# Patient Record
Sex: FEMALE | Race: WHITE | NOT HISPANIC OR LATINO | Employment: UNEMPLOYED | URBAN - METROPOLITAN AREA
[De-identification: names, ages, dates, MRNs, and addresses within clinical notes are randomized per-mention and may not be internally consistent; named-entity substitution may affect disease eponyms.]

---

## 2019-08-25 ENCOUNTER — HOSPITAL ENCOUNTER (EMERGENCY)
Facility: HOSPITAL | Age: 54
Discharge: HOME | End: 2019-08-25
Attending: EMERGENCY MEDICINE | Admitting: EMERGENCY MEDICINE
Payer: COMMERCIAL

## 2019-08-25 ENCOUNTER — APPOINTMENT (EMERGENCY)
Dept: RADIOLOGY | Facility: HOSPITAL | Age: 54
End: 2019-08-25
Payer: COMMERCIAL

## 2019-08-25 VITALS
TEMPERATURE: 98.3 F | HEART RATE: 62 BPM | DIASTOLIC BLOOD PRESSURE: 70 MMHG | BODY MASS INDEX: 31.39 KG/M2 | HEIGHT: 67 IN | SYSTOLIC BLOOD PRESSURE: 166 MMHG | RESPIRATION RATE: 18 BRPM | OXYGEN SATURATION: 100 % | WEIGHT: 200 LBS
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
## 2019-08-25 DIAGNOSIS — S80.212A ABRASION OF LEFT KNEE: ICD-10-CM

## 2019-08-25 DIAGNOSIS — S80.02XA CONTUSION OF LEFT KNEE: ICD-10-CM

## 2019-08-25 DIAGNOSIS — S50.00XA CONTUSION OF ELBOW: ICD-10-CM

## 2019-08-25 PROCEDURE — 73564 X-RAY EXAM KNEE 4 OR MORE: CPT

## 2019-08-25 PROCEDURE — 73080 X-RAY EXAM OF ELBOW: CPT

## 2019-08-25 PROCEDURE — 99283 EMERGENCY DEPT VISIT LOW MDM: CPT

## 2019-08-25 PROCEDURE — 99282 EMERGENCY DEPT VISIT SF MDM: CPT | Performed by: PHYSICIAN ASSISTANT

## 2019-08-25 RX ORDER — VENLAFAXINE HYDROCHLORIDE 150 MG/1
150 CAPSULE, EXTENDED RELEASE ORAL DAILY
COMMUNITY

## 2019-08-25 RX ORDER — ALPRAZOLAM 1 MG/1
TABLET ORAL
COMMUNITY

## 2019-08-25 NOTE — ED PROVIDER NOTES
History  Chief Complaint   Patient presents with    Elbow Injury     pt presents to ER stating she was at the grocery store, she fell landing on her left elbow, states she cannot move her arm, and her hand is now feeling numb  pt also complains of left knee pain      Patient is a 50 y/o F that presents to the ED with left elbow pain and left knee pain after falling at the grocery store 6 hours ago  She states she was checking out at the register and forgot something so she started walking fast to get the item and her flip flop stuck to the floor and she fell onto her left elbow and left knee  She denies any other injuries  She denies head injury or LOC  She denies prior injury to left elbow  She does have arthritis of B/L knees  She states the pain in her left arm shoots down her arm and she has numbness in her left hand when she extends her left elbow  History provided by:  Patient  Fall   Mechanism of injury: fall    Injury location:  Shoulder/arm and leg  Shoulder/arm injury location:  L elbow  Leg injury location:  L knee  Incident location: [employer]  Time since incident:  6 hours  Arrived directly from scene: no    Fall:     Fall occurred:  Tripped    Impact surface:  Hard floor    Point of impact: left side  Suspicion of alcohol use: no    Suspicion of drug use: no    Tetanus status:  Unknown  Prior to arrival data:     Patient ambulatory at scene: yes      Blood loss:  None    Responsiveness at scene:  Alert    Orientation at scene:  Person, place and situation    Loss of consciousness: no      Amnesic to event: no    Associated symptoms: no abdominal pain, no back pain, no blindness, no chest pain, no difficulty breathing, no headaches, no hearing loss, no loss of consciousness, no nausea, no neck pain, no seizures and no vomiting    Risk factors: no anticoagulation therapy        Prior to Admission Medications   Prescriptions Last Dose Informant Patient Reported? Taking?    ALPRAZolam Jennifertaye Sotoa) 1 mg tablet   Yes Yes   Sig: Take by mouth daily at bedtime as needed for anxiety   venlafaxine (EFFEXOR-XR) 150 mg 24 hr capsule   Yes Yes   Sig: Take 150 mg by mouth daily      Facility-Administered Medications: None       Past Medical History:   Diagnosis Date    Arthritis        Past Surgical History:   Procedure Laterality Date    HYSTERECTOMY      KIDNEY SURGERY      KNEE CARTILAGE SURGERY      THYROIDECTOMY      TONSILLECTOMY         History reviewed  No pertinent family history  I have reviewed and agree with the history as documented  Social History     Tobacco Use    Smoking status: Current Every Day Smoker     Packs/day: 0 50    Smokeless tobacco: Never Used   Substance Use Topics    Alcohol use: Not Currently    Drug use: Never        Review of Systems   Constitutional: Negative for chills and fever  HENT: Negative for hearing loss  Eyes: Negative for blindness and visual disturbance  Cardiovascular: Negative for chest pain  Gastrointestinal: Negative for abdominal pain, nausea and vomiting  Musculoskeletal: Negative for back pain and neck pain  Skin: Positive for wound (superficial clean abrasion left knee)  Negative for color change  Neurological: Negative for dizziness, seizures, loss of consciousness, weakness, numbness and headaches  Psychiatric/Behavioral: Negative for confusion  All other systems reviewed and are negative  Physical Exam  Physical Exam   Constitutional: She is oriented to person, place, and time  She appears well-developed and well-nourished  HENT:   Head: Normocephalic and atraumatic  Eyes: Conjunctivae are normal    Neck: Normal range of motion  Cardiovascular: Normal rate, regular rhythm and normal heart sounds  Pulses:       Radial pulses are 2+ on the left side  Dorsalis pedis pulses are 2+ on the left side     Pulmonary/Chest: Effort normal and breath sounds normal    Musculoskeletal:        Left shoulder: Normal  Left elbow: She exhibits decreased range of motion (pain with extension of left elbow  )  She exhibits no swelling, no effusion, no deformity and no laceration  Tenderness found  Olecranon process tenderness noted  Left wrist: Normal         Left hip: Normal         Left knee: She exhibits normal range of motion, no swelling, no deformity and no erythema  Tenderness found  Left ankle: Normal         Left hand: Normal    RUE and RLE FROM, nontender to palpation  Neurological: She is alert and oriented to person, place, and time  She has normal strength  No sensory deficit  Skin: Skin is warm and dry  Abrasion (superficial clean abrasion to left knee  ) noted  No rash noted  She is not diaphoretic  No pallor  Nursing note and vitals reviewed  Vital Signs  ED Triage Vitals [08/25/19 1649]   Temperature Pulse Respirations Blood Pressure SpO2   98 3 °F (36 8 °C) 70 19 140/69 98 %      Temp src Heart Rate Source Patient Position - Orthostatic VS BP Location FiO2 (%)   -- -- Lying Right arm --      Pain Score       Worst Possible Pain           Vitals:    08/25/19 1649 08/25/19 1700 08/25/19 1715 08/25/19 1730   BP: 140/69 129/64 124/60 166/70   Pulse: 70 63  62   Patient Position - Orthostatic VS: Lying Lying Lying Lying         Visual Acuity      ED Medications  Medications - No data to display    Diagnostic Studies  Results Reviewed     None                 XR elbow 3+ views LEFT   ED Interpretation by Roz Lefort, PA-C (08/25 1739)   No acute fracture  XR knee 4+ views left injury   ED Interpretation by Roz Lefort, PA-C (08/25 1739)   No acute fracture  Mild Degenerative changes  Procedures  Procedures       ED Course  ED Course as of Aug 25 1858   Shravan Lea Aug 25, 2019   1725 Patient was c/o chest pain while getting xray  She states it is her anxiety  I was going to order EKG, but patient is refusing and states she is calming herself down  MDM  Number of Diagnoses or Management Options  Abrasion of left knee: minor  Contusion of elbow: new and requires workup  Contusion of left knee: new and requires workup  Fall: new and requires workup  Diagnosis management comments: Patient with fall today, pain in elbow and knee, will xray to r/o fracture  No fx on xray, will give sling and refer to ortho  No head injury or LOC  Amount and/or Complexity of Data Reviewed  Tests in the radiology section of CPT®: ordered and reviewed  Independent visualization of images, tracings, or specimens: yes    Patient Progress  Patient progress: stable      Disposition  Final diagnoses:   Fall   Contusion of elbow   Contusion of left knee   Abrasion of left knee     Time reflects when diagnosis was documented in both MDM as applicable and the Disposition within this note     Time User Action Codes Description Comment    8/25/2019  5:44 PM Althea Mathews Add [P98  XXXA] Fall     8/25/2019  5:44 PM Althea Mcraezabeth Add [S50 00XA] Contusion of elbow     8/25/2019  5:45 PM Nickward Bisi Add [S80 02XA] Contusion of left knee     8/25/2019  5:45 PM Althea Mathews Add [G71 102B] Abrasion of left knee       ED Disposition     ED Disposition Condition Date/Time Comment    Discharge Stable Sun Aug 25, 2019  5:44 PM Jordana Jiang discharge to home/self care              Follow-up Information     Follow up With Specialties Details Why Contact Emily Kenney MD Orthopedic Surgery Call in 1 week As needed, For recheck Mario Miranda [phone]  [phone]            Discharge Medication List as of 8/25/2019  5:47 PM      CONTINUE these medications which have NOT CHANGED    Details   ALPRAZolam (XANAX) 1 mg tablet Take by mouth daily at bedtime as needed for anxiety, Historical Med      venlafaxine (EFFEXOR-XR) 150 mg 24 hr capsule Take 150 mg by mouth daily, Historical Med           No discharge procedures on file      ED Provider  Electronically Signed by           Jett Loza PA-C  08/25/19 1267

## 2019-08-25 NOTE — DISCHARGE INSTRUCTIONS
Rest, ice, elevate arm  Ice, elevate leg  Sling until seen by ortho  Tylenol/motrin as needed for pain  Follow up with ortho if no improvement in 5-7 days

## 2019-08-29 ENCOUNTER — OFFICE VISIT (OUTPATIENT)
Dept: OBGYN CLINIC | Facility: CLINIC | Age: 54
End: 2019-08-29
Payer: COMMERCIAL

## 2019-08-29 VITALS
DIASTOLIC BLOOD PRESSURE: 73 MMHG | WEIGHT: 200 LBS | BODY MASS INDEX: 31.39 KG/M2 | HEIGHT: 67 IN | SYSTOLIC BLOOD PRESSURE: 125 MMHG

## 2019-08-29 DIAGNOSIS — S50.02XD CONTUSION OF LEFT ELBOW, SUBSEQUENT ENCOUNTER: Primary | ICD-10-CM

## 2019-08-29 PROBLEM — S50.02XA LEFT ELBOW CONTUSION: Status: ACTIVE | Noted: 2019-08-29

## 2019-08-29 PROCEDURE — 99203 OFFICE O/P NEW LOW 30 MIN: CPT | Performed by: ORTHOPAEDIC SURGERY

## 2019-08-29 NOTE — ASSESSMENT & PLAN NOTE
Findings consistent with left elbow contusion  Findings and treatment options were discussed with the patient and her   X-rays were reviewed with them  Advised patient to continue limiting use of that arm  No repetitive activities with that arm  Continue ice to the left elbow  Encouraged patient to gently move the elbow multiple times a day to prevent stiffness  Follow-up in 4-6 weeks for re-evaluation  All questions were answered to patient's satisfaction

## 2019-08-29 NOTE — PROGRESS NOTES
Assessment:     1  Contusion of left elbow, subsequent encounter        Plan:  The patient was seen and examined by Dr Holly Hairston and myself  Problem List Items Addressed This Visit        Other    Left elbow contusion - Primary     Findings consistent with left elbow contusion  Findings and treatment options were discussed with the patient and her   X-rays were reviewed with them  Advised patient to continue limiting use of that arm  No repetitive activities with that arm  Continue ice to the left elbow  Encouraged patient to gently move the elbow multiple times a day to prevent stiffness  Follow-up in 4-6 weeks for re-evaluation  All questions were answered to patient's satisfaction  Subjective:     Patient ID: Jorge Diaz is a 49 y o  female  Chief Complaint: This is a 71-year-old white female who suffered injury to her left elbow on August 25, 2019  Patient states that she slipped in the grocery store and fell directly on her left elbow  She felt immediate pain and had difficulty moving it  She was seen emergency room and x-rays revealed no acute fractures  She was placed in a sling  She stopped using the sling a day later due to cervical spine pain  Her range of motion has been slowly improving, but she is concerned that she continues to have limitations  No other treatment as of yet  She denies any prior injury to that elbow  Patient intake form was reviewed today  Allergy:  Allergies   Allergen Reactions    Penicillins      Medications:  all current active meds have been reviewed  Past Medical History:  Past Medical History:   Diagnosis Date    Arthritis      Past Surgical History:  Past Surgical History:   Procedure Laterality Date    HYSTERECTOMY      KIDNEY SURGERY      KNEE CARTILAGE SURGERY      THYROIDECTOMY      TONSILLECTOMY       Family History:  History reviewed  No pertinent family history    Social History:  Social History     Substance and Sexual Activity   Alcohol Use Not Currently     Social History     Substance and Sexual Activity   Drug Use Never     Social History     Tobacco Use   Smoking Status Current Every Day Smoker    Packs/day: 0 50   Smokeless Tobacco Never Used     Review of Systems   Constitutional: Negative  HENT: Negative  Eyes: Negative  Respiratory: Negative  Cardiovascular: Negative  Gastrointestinal: Negative  Endocrine: Negative  Genitourinary: Negative  Musculoskeletal: Positive for arthralgias and joint swelling  Skin: Negative  Allergic/Immunologic: Negative  Neurological: Negative  Hematological: Negative  Psychiatric/Behavioral: Negative  Objective:  BP Readings from Last 1 Encounters:   08/29/19 125/73      Wt Readings from Last 1 Encounters:   08/29/19 90 7 kg (200 lb)      BMI:   Estimated body mass index is 31 32 kg/m² as calculated from the following:    Height as of this encounter: 5' 7" (1 702 m)  Weight as of this encounter: 90 7 kg (200 lb)  BSA:   Estimated body surface area is 2 02 meters squared as calculated from the following:    Height as of this encounter: 5' 7" (1 702 m)  Weight as of this encounter: 90 7 kg (200 lb)  Physical Exam   Constitutional: She is oriented to person, place, and time  She appears well-developed  HENT:   Head: Normocephalic and atraumatic  Eyes: Conjunctivae and EOM are normal    Neck: Neck supple  Neurological: She is alert and oriented to person, place, and time  Skin: Skin is warm  Psychiatric: She has a normal mood and affect  Nursing note and vitals reviewed  Right Elbow Exam   Right elbow exam is normal       Left Elbow Exam     Tenderness   Left elbow tenderness location: Diffusely posterior       Range of Motion   Extension: -10   Flexion: 100   Pronation: normal   Supination: normal     Tests   Varus: negative  Valgus: negative    Other   Erythema: absent  Scars: absent  Sensation: normal  Pulse: present    Comments:  Soft tissue swelling and ecchymosis over posterior elbow  No open wounds              I have personally reviewed pertinent films in PACS and my interpretation is X-rays left elbow reveal no acute fractures  No soft tissue calcifications present  Aubree Cameron

## 2022-04-09 ENCOUNTER — APPOINTMENT (EMERGENCY)
Dept: RADIOLOGY | Facility: HOSPITAL | Age: 57
End: 2022-04-09
Payer: COMMERCIAL

## 2022-04-09 ENCOUNTER — HOSPITAL ENCOUNTER (EMERGENCY)
Facility: HOSPITAL | Age: 57
Discharge: HOME | End: 2022-04-09
Attending: EMERGENCY MEDICINE | Admitting: EMERGENCY MEDICINE
Payer: COMMERCIAL

## 2022-04-09 VITALS
DIASTOLIC BLOOD PRESSURE: 95 MMHG | OXYGEN SATURATION: 99 % | WEIGHT: 199.96 LBS | SYSTOLIC BLOOD PRESSURE: 145 MMHG | BODY MASS INDEX: 31.32 KG/M2 | HEART RATE: 77 BPM | RESPIRATION RATE: 20 BRPM | TEMPERATURE: 98.3 F

## 2022-04-09 DIAGNOSIS — M25.562 LEFT KNEE PAIN: ICD-10-CM

## 2022-04-09 DIAGNOSIS — M25.561 RIGHT KNEE PAIN: Primary | ICD-10-CM

## 2022-04-09 DIAGNOSIS — M17.0 OSTEOARTHRITIS OF BOTH KNEES: ICD-10-CM

## 2022-04-09 LAB
ALBUMIN SERPL BCP-MCNC: 3.5 G/DL (ref 3.5–5)
ALP SERPL-CCNC: 69 U/L (ref 46–116)
ALT SERPL W P-5'-P-CCNC: 17 U/L (ref 12–78)
ANION GAP SERPL CALCULATED.3IONS-SCNC: 6 MMOL/L (ref 4–13)
AST SERPL W P-5'-P-CCNC: 16 U/L (ref 5–45)
BASOPHILS # BLD AUTO: 0.05 THOUSANDS/ΜL (ref 0–0.1)
BASOPHILS NFR BLD AUTO: 1 % (ref 0–1)
BILIRUB SERPL-MCNC: 0.5 MG/DL (ref 0.2–1)
BUN SERPL-MCNC: 14 MG/DL (ref 5–25)
CALCIUM SERPL-MCNC: 8.4 MG/DL (ref 8.3–10.1)
CHLORIDE SERPL-SCNC: 105 MMOL/L (ref 100–108)
CO2 SERPL-SCNC: 26 MMOL/L (ref 21–32)
CREAT SERPL-MCNC: 0.79 MG/DL (ref 0.6–1.3)
D DIMER PPP FEU-MCNC: 0.46 UG/ML FEU
EOSINOPHIL # BLD AUTO: 0.1 THOUSAND/ΜL (ref 0–0.61)
EOSINOPHIL NFR BLD AUTO: 1 % (ref 0–6)
ERYTHROCYTE [DISTWIDTH] IN BLOOD BY AUTOMATED COUNT: 13.5 % (ref 11.6–15.1)
GFR SERPL CREATININE-BSD FRML MDRD: 84 ML/MIN/1.73SQ M
GLUCOSE SERPL-MCNC: 93 MG/DL (ref 65–140)
HCT VFR BLD AUTO: 41.7 % (ref 34.8–46.1)
HGB BLD-MCNC: 13.7 G/DL (ref 11.5–15.4)
IMM GRANULOCYTES # BLD AUTO: 0.01 THOUSAND/UL (ref 0–0.2)
IMM GRANULOCYTES NFR BLD AUTO: 0 % (ref 0–2)
LYMPHOCYTES # BLD AUTO: 3.52 THOUSANDS/ΜL (ref 0.6–4.47)
LYMPHOCYTES NFR BLD AUTO: 44 % (ref 14–44)
MCH RBC QN AUTO: 29.3 PG (ref 26.8–34.3)
MCHC RBC AUTO-ENTMCNC: 32.9 G/DL (ref 31.4–37.4)
MCV RBC AUTO: 89 FL (ref 82–98)
MONOCYTES # BLD AUTO: 0.4 THOUSAND/ΜL (ref 0.17–1.22)
MONOCYTES NFR BLD AUTO: 5 % (ref 4–12)
NEUTROPHILS # BLD AUTO: 4 THOUSANDS/ΜL (ref 1.85–7.62)
NEUTS SEG NFR BLD AUTO: 49 % (ref 43–75)
NRBC BLD AUTO-RTO: 0 /100 WBCS
PLATELET # BLD AUTO: 261 THOUSANDS/UL (ref 149–390)
PMV BLD AUTO: 10.8 FL (ref 8.9–12.7)
POTASSIUM SERPL-SCNC: 3.6 MMOL/L (ref 3.5–5.3)
PROT SERPL-MCNC: 7.1 G/DL (ref 6.4–8.2)
RBC # BLD AUTO: 4.67 MILLION/UL (ref 3.81–5.12)
SODIUM SERPL-SCNC: 137 MMOL/L (ref 136–145)
WBC # BLD AUTO: 8.08 THOUSAND/UL (ref 4.31–10.16)

## 2022-04-09 PROCEDURE — 96374 THER/PROPH/DIAG INJ IV PUSH: CPT

## 2022-04-09 PROCEDURE — 73560 X-RAY EXAM OF KNEE 1 OR 2: CPT

## 2022-04-09 PROCEDURE — 85025 COMPLETE CBC W/AUTO DIFF WBC: CPT | Performed by: PHYSICIAN ASSISTANT

## 2022-04-09 PROCEDURE — 85379 FIBRIN DEGRADATION QUANT: CPT | Performed by: PHYSICIAN ASSISTANT

## 2022-04-09 PROCEDURE — 80053 COMPREHEN METABOLIC PANEL: CPT | Performed by: PHYSICIAN ASSISTANT

## 2022-04-09 PROCEDURE — 99284 EMERGENCY DEPT VISIT MOD MDM: CPT

## 2022-04-09 PROCEDURE — 36415 COLL VENOUS BLD VENIPUNCTURE: CPT | Performed by: PHYSICIAN ASSISTANT

## 2022-04-09 PROCEDURE — 99285 EMERGENCY DEPT VISIT HI MDM: CPT | Performed by: PHYSICIAN ASSISTANT

## 2022-04-09 PROCEDURE — 96361 HYDRATE IV INFUSION ADD-ON: CPT

## 2022-04-09 RX ORDER — NAPROXEN 500 MG/1
500 TABLET ORAL 2 TIMES DAILY PRN
Qty: 10 TABLET | Refills: 0 | Status: SHIPPED | OUTPATIENT
Start: 2022-04-09

## 2022-04-09 RX ORDER — KETOROLAC TROMETHAMINE 30 MG/ML
15 INJECTION, SOLUTION INTRAMUSCULAR; INTRAVENOUS ONCE
Status: COMPLETED | OUTPATIENT
Start: 2022-04-09 | End: 2022-04-09

## 2022-04-09 RX ADMIN — SODIUM CHLORIDE 500 ML: 9 INJECTION, SOLUTION INTRAVENOUS at 19:10

## 2022-04-09 RX ADMIN — KETOROLAC TROMETHAMINE 15 MG: 30 INJECTION, SOLUTION INTRAMUSCULAR at 19:21

## 2022-04-09 NOTE — ED PROVIDER NOTES
History  Chief Complaint   Patient presents with    Knee Pain     This is a 44-year-old female patient who presents with a week-long left knee pain  States the dorsal lateral aspect moves into her meniscus when she walks feels like something is tearing she is able to ambulate  Over the last day she started getting similar symptoms on the right side  In August 25, 2019 she had an x-ray at this facility which showed that she has tricompartmental degenerative changes  Was told by orthopedic surgeon she requires replacements  She has no calf tenderness  History of Baker's cysts not sure which knee is concerned that she may have blood clots because she has this fullness on both sides posterior knees  Worse when she stands  Denies any redness or warmth  No history of blood clots  She has tried nothing over-the-counter  When asked why she states she does not think if necessary  Would like to know why she has ripping type pain in her left knee when she walks  Explain her that we will get x-rays and she could have a D-dimer blood clot is extremely low based on the fact that she  both legs a knot on the CT chest pressure behind each knee     She is now willing to have some Toradol for discomfort  Prior to Admission Medications   Prescriptions Last Dose Informant Patient Reported? Taking? ALPRAZolam (XANAX) 1 mg tablet  Self Yes No   Sig: Take by mouth daily at bedtime as needed for anxiety   venlafaxine (EFFEXOR-XR) 150 mg 24 hr capsule  Self Yes No   Sig: Take 150 mg by mouth daily      Facility-Administered Medications: None       Past Medical History:   Diagnosis Date    Anxiety     Arthritis     Psychiatric disorder        Past Surgical History:   Procedure Laterality Date    HYSTERECTOMY      KIDNEY SURGERY      KNEE CARTILAGE SURGERY      THYROIDECTOMY      TONSILLECTOMY         History reviewed  No pertinent family history    I have reviewed and agree with the history as documented  E-Cigarette/Vaping    E-Cigarette Use Never User      E-Cigarette/Vaping Substances    Nicotine No      Social History     Tobacco Use    Smoking status: Current Every Day Smoker     Packs/day: 0 50    Smokeless tobacco: Never Used   Vaping Use    Vaping Use: Never used   Substance Use Topics    Alcohol use: Not Currently    Drug use: Never       Review of Systems   Constitutional: Negative for chills, fatigue and fever  HENT: Negative for congestion, ear pain, hearing loss and sore throat  Eyes: Negative for photophobia, pain and visual disturbance  Respiratory: Negative for cough, chest tightness and shortness of breath  Cardiovascular: Negative for chest pain, palpitations and leg swelling  Gastrointestinal: Negative for abdominal pain, diarrhea, nausea and vomiting  Endocrine: Negative for polydipsia and polyphagia  Genitourinary: Negative for dysuria, frequency and hematuria  Musculoskeletal: Positive for gait problem  Negative for arthralgias, back pain, joint swelling (Pain in both knees), myalgias, neck pain and neck stiffness  Skin: Negative for color change, pallor and rash  Allergic/Immunologic: Negative for environmental allergies and food allergies  Neurological: Negative for dizziness, seizures, syncope and headaches  Psychiatric/Behavioral: Negative for agitation and confusion  All other systems reviewed and are negative  Physical Exam  Physical Exam  Vitals and nursing note reviewed  Constitutional:       General: She is not in acute distress  Appearance: Normal appearance  She is obese  She is not ill-appearing, toxic-appearing or diaphoretic  HENT:      Head: Normocephalic and atraumatic  Right Ear: Tympanic membrane, ear canal and external ear normal       Left Ear: Tympanic membrane, ear canal and external ear normal       Nose: Nose normal  No congestion or rhinorrhea  Mouth/Throat:      Mouth: Mucous membranes are dry  Pharynx: Oropharynx is clear  No oropharyngeal exudate or posterior oropharyngeal erythema  Eyes:      Extraocular Movements: Extraocular movements intact  Conjunctiva/sclera: Conjunctivae normal       Pupils: Pupils are equal, round, and reactive to light  Cardiovascular:      Rate and Rhythm: Normal rate and regular rhythm  Pulmonary:      Effort: Pulmonary effort is normal  No respiratory distress  Breath sounds: Normal breath sounds  Abdominal:      General: Bowel sounds are normal       Palpations: Abdomen is soft  Tenderness: There is no abdominal tenderness  Musculoskeletal:         General: Normal range of motion  Cervical back: Normal range of motion and neck supple  No rigidity or tenderness  Right lower leg: No edema  Left lower leg: No edema  Legs:    Lymphadenopathy:      Cervical: No cervical adenopathy  Skin:     General: Skin is warm and dry  Capillary Refill: Capillary refill takes less than 2 seconds  Findings: No rash  Neurological:      General: No focal deficit present  Mental Status: She is alert and oriented to person, place, and time  Mental status is at baseline     Psychiatric:         Mood and Affect: Mood normal          Behavior: Behavior normal          Vital Signs  ED Triage Vitals [04/09/22 1809]   Temperature Pulse Respirations Blood Pressure SpO2   98 3 °F (36 8 °C) 77 20 145/95 99 %      Temp Source Heart Rate Source Patient Position - Orthostatic VS BP Location FiO2 (%)   Temporal Monitor Sitting Right arm --      Pain Score       8           Vitals:    04/09/22 1809   BP: 145/95   Pulse: 77   Patient Position - Orthostatic VS: Sitting         Visual Acuity      ED Medications  Medications   ketorolac (TORADOL) injection 15 mg (15 mg Intravenous Given 4/9/22 1921)   sodium chloride 0 9 % bolus 500 mL (0 mL Intravenous Stopped 4/9/22 2004)       Diagnostic Studies  Results Reviewed     Procedure Component Value Units Date/Time    Comprehensive metabolic panel [[id number]] Collected: 04/09/22 1908    Lab Status: Final result Specimen: Blood from Arm, Left Updated: 04/09/22 1959     Sodium 137 mmol/L      Potassium 3 6 mmol/L      Chloride 105 mmol/L      CO2 26 mmol/L      ANION GAP 6 mmol/L      BUN 14 mg/dL      Creatinine 0 79 mg/dL      Glucose 93 mg/dL      Calcium 8 4 mg/dL      AST 16 U/L      ALT 17 U/L      Alkaline Phosphatase 69 U/L      Total Protein 7 1 g/dL      Albumin 3 5 g/dL      Total Bilirubin 0 50 mg/dL      eGFR 84 ml/min/1 73sq m     Narrative:      [place] guidelines for Chronic Kidney Disease (CKD):     Stage 1 with normal or high GFR (GFR > 90 mL/min/1 73 square meters)    Stage 2 Mild CKD (GFR = 60-89 mL/min/1 73 square meters)    Stage 3A Moderate CKD (GFR = 45-59 mL/min/1 73 square meters)    Stage 3B Moderate CKD (GFR = 30-44 mL/min/1 73 square meters)    Stage 4 Severe CKD (GFR = 15-29 mL/min/1 73 square meters)    Stage 5 End Stage CKD (GFR <15 mL/min/1 73 square meters)  Note: GFR calculation is accurate only with a steady state creatinine    D-Dimer [[id number]]  (Normal) Collected: 04/09/22 1908    Lab Status: Final result Specimen: Blood from Arm, Left Updated: 04/09/22 1941     D-Dimer, Quant 0 46 ug/ml FEU     Narrative: In the evaluation for possible pulmonary embolism, in the appropriate (Well's Score of 4 or less) patient, the age adjusted d-dimer cutoff for this patient can be calculated as:    Age x 0 01 (in ug/mL) for Age-adjusted D-dimer exclusion threshold for a patient over 50 years      CBC and differential [[id number]] Collected: 04/09/22 1908    Lab Status: Final result Specimen: Blood from Arm, Left Updated: 04/09/22 1922     WBC 8 08 Thousand/uL      RBC 4 67 Million/uL      Hemoglobin 13 7 g/dL      Hematocrit 41 7 %      MCV 89 fL      MCH 29 3 pg      MCHC 32 9 g/dL      RDW 13 5 %      MPV 10 8 fL      Platelets 998 Thousands/uL      nRBC 0 /100 WBCs      Neutrophils Relative 49 %      Immat GRANS % 0 %      Lymphocytes Relative 44 %      Monocytes Relative 5 %      Eosinophils Relative 1 %      Basophils Relative 1 %      Neutrophils Absolute 4 00 Thousands/µL      Immature Grans Absolute 0 01 Thousand/uL      Lymphocytes Absolute 3 52 Thousands/µL      Monocytes Absolute 0 40 Thousand/µL      Eosinophils Absolute 0 10 Thousand/µL      Basophils Absolute 0 05 Thousands/µL                  XR knee 1 or 2 vw left   ED Interpretation by Barrington Martines PA-C (04/09 1842)   Degenerative joint disease with narrowing of joint space lateral greater than medial   No fracture dislocation or effusion      Final Result by Yolis Washington MD (04/10 5105)      No acute osseous abnormality  Moderate tricompartmental osteoarthritis  Small joint effusion  Workstation performed: [id number]         XR knee 1 or 2 vw right   ED Interpretation by Barrington Martines PA-C (04/09 1842)   Degenerative joint disease with narrowing of joint space lateral greater than medial   No fracture dislocation or effusion        Final Result by Armando Ward MD (04/10 1406)      No acute osseous abnormality  Severe lateral and moderate medial compartment osteoarthritis  Workstation performed: [id number]                    Procedures  Procedures         ED Course                               SBIRT 20yo+      Most Recent Value   SBIRT (24 yo +)    In order to provide better care to our patients, we are screening all of our patients for alcohol and drug use  Would it be okay to ask you these screening questions? Yes Filed at: 04/09/2022 1916   Initial Alcohol Screen: US AUDIT-C     1  How often do you have a drink containing alcohol? 0 Filed at: 04/09/2022 1916   2  How many drinks containing alcohol do you have on a typical day you are drinking? 0 Filed at: 04/09/2022 1916   3a  Male UNDER 65:  How often do you have five or more drinks on one occasion? 0 Filed at: 04/09/2022 1916   3b  FEMALE Any Age, or MALE 65+: How often do you have 4 or more drinks on one occassion? 0 Filed at: 04/09/2022 1916   Audit-C Score 0 Filed at: 04/09/2022 1916   MIYA: How many times in the past year have you    Used an illegal drug or used a prescription medication for non-medical reasons? Never Filed at: 04/09/2022 1916                    MDM    Disposition  Final diagnoses:   Right knee pain   Left knee pain   Osteoarthritis of both knees     Time reflects when diagnosis was documented in both MDM as applicable and the Disposition within this note     Time User Action Codes Description Comment    4/9/2022  7:45 PM [facility], [address] [facility] Right knee pain     4/9/2022  7:45 PM Hernandez Johnson Add [M25 562] Left knee pain     4/9/2022  7:45 PM Hernandez Johnson Add [M17 0] Osteoarthritis of both knees       ED Disposition     ED Disposition Condition Date/Time Comment    Discharge Stable Sat Apr 9, 2022  7:45 PM Carlotta Clifton discharge to home/self care              Follow-up Information     Follow up With Specialties Details Why Contact Info Additional [address] [facility] Specialists [facility] Schedule an appointment as soon as possible for a visit   [phone] [facility] [phone]  [address] [place] [address] Specialists [facility], [address], [facility], [address] [facility], [facility] [address]          Discharge Medication List as of 4/9/2022  7:48 PM      START taking these medications    Details   naproxen (Naprosyn) 500 mg tablet Take 1 tablet (500 mg total) by mouth 2 (two) times a day as needed for mild pain, Starting Sat 4/9/2022, Normal         CONTINUE these medications which have NOT CHANGED    Details   ALPRAZolam (XANAX) 1 mg tablet Take by mouth daily at bedtime as needed for anxiety, Historical Med      venlafaxine (EFFEXOR-XR) 150 mg 24 hr capsule Take 150 mg by mouth daily, Historical Med             No discharge procedures on file      PDMP Review     None          ED Provider  Electronically Signed by           Manjula Castañeda PA-C  04/11/22 9253